# Patient Record
Sex: FEMALE | Race: WHITE | NOT HISPANIC OR LATINO | Employment: OTHER | URBAN - METROPOLITAN AREA
[De-identification: names, ages, dates, MRNs, and addresses within clinical notes are randomized per-mention and may not be internally consistent; named-entity substitution may affect disease eponyms.]

---

## 2017-01-26 ENCOUNTER — PREPPED CHART (OUTPATIENT)
Dept: URBAN - METROPOLITAN AREA CLINIC 35 | Facility: CLINIC | Age: 62
End: 2017-01-26

## 2017-09-25 ASSESSMENT — TONOMETRY
OS_IOP_MMHG: 11
OD_IOP_MMHG: 11

## 2017-09-25 ASSESSMENT — VISUAL ACUITY
OS_CC: 20/40
OD_CC: 20/30

## 2018-01-24 ENCOUNTER — ESTABLISHED COMPREHENSIVE EXAM (OUTPATIENT)
Dept: URBAN - METROPOLITAN AREA CLINIC 35 | Facility: CLINIC | Age: 63
End: 2018-01-24

## 2018-01-24 DIAGNOSIS — H04.123: ICD-10-CM

## 2018-01-24 DIAGNOSIS — H43.813: ICD-10-CM

## 2018-01-24 PROCEDURE — 92015 DETERMINE REFRACTIVE STATE: CPT

## 2018-01-24 PROCEDURE — 92014 COMPRE OPH EXAM EST PT 1/>: CPT

## 2018-01-24 PROCEDURE — 92310-1 LEVEL 1 CONTACT LENS MANAGEMENT

## 2018-01-24 ASSESSMENT — VISUAL ACUITY
OD_SC: CF 4FT
OS_CC: 20/25-1
OS_SC: CF 3FT
OD_CC: 20/40+2

## 2018-01-24 ASSESSMENT — TONOMETRY
OD_IOP_MMHG: 18
OS_IOP_MMHG: 19

## 2018-06-12 ENCOUNTER — EST. PATIENT EMERGENCY (OUTPATIENT)
Dept: URBAN - METROPOLITAN AREA CLINIC 35 | Facility: CLINIC | Age: 63
End: 2018-06-12

## 2018-06-12 DIAGNOSIS — H16.103: ICD-10-CM

## 2018-06-12 DIAGNOSIS — H04.123: ICD-10-CM

## 2018-06-12 PROCEDURE — 92012 INTRM OPH EXAM EST PATIENT: CPT

## 2018-06-12 ASSESSMENT — VISUAL ACUITY
OU_CC: J2 CL
OS_CC: 20/200 CL
OD_CC: 20/30-2 CL
OU_CC: 20/30-2

## 2018-06-12 ASSESSMENT — TONOMETRY
OS_IOP_MMHG: 13
OD_IOP_MMHG: 13

## 2018-07-09 ENCOUNTER — FOLLOW UP (OUTPATIENT)
Dept: URBAN - METROPOLITAN AREA CLINIC 35 | Facility: CLINIC | Age: 63
End: 2018-07-09

## 2018-07-09 DIAGNOSIS — H16.103: ICD-10-CM

## 2018-07-09 DIAGNOSIS — H04.123: ICD-10-CM

## 2018-07-09 PROCEDURE — 92012 INTRM OPH EXAM EST PATIENT: CPT

## 2018-07-09 PROCEDURE — A4263 PERMANENT TEAR DUCT PLUG: HCPCS

## 2018-07-09 PROCEDURE — 68761S PUNCTUM PLUG / SILICONE,EACH

## 2018-07-09 ASSESSMENT — VISUAL ACUITY: OS_CC: 20/40 W/ CLS

## 2019-10-08 ENCOUNTER — ESTABLISHED COMPREHENSIVE EXAM (OUTPATIENT)
Dept: URBAN - METROPOLITAN AREA CLINIC 35 | Facility: CLINIC | Age: 64
End: 2019-10-08

## 2019-10-08 DIAGNOSIS — H52.13: ICD-10-CM

## 2019-10-08 DIAGNOSIS — H25.13: ICD-10-CM

## 2019-10-08 DIAGNOSIS — Z46.0: ICD-10-CM

## 2019-10-08 PROCEDURE — 92014 COMPRE OPH EXAM EST PT 1/>: CPT

## 2019-10-08 PROCEDURE — 92015 DETERMINE REFRACTIVE STATE: CPT

## 2019-10-08 PROCEDURE — 92310-1 LEVEL 1 CONTACT LENS MANAGEMENT

## 2019-10-08 ASSESSMENT — VISUAL ACUITY
OD_CC: J2
OD_SC: CF 6FT
OD_CC: 20/25
OS_CC: 20/40+2
OS_SC: CF 3FT
OS_CC: J2+

## 2019-10-08 ASSESSMENT — TONOMETRY
OS_IOP_MMHG: 11
OD_IOP_MMHG: 10

## 2019-10-25 ENCOUNTER — OFFICE VISIT (OUTPATIENT)
Dept: URGENT CARE | Facility: CLINIC | Age: 64
End: 2019-10-25
Payer: COMMERCIAL

## 2019-10-25 VITALS
BODY MASS INDEX: 23.19 KG/M2 | RESPIRATION RATE: 16 BRPM | OXYGEN SATURATION: 99 % | TEMPERATURE: 98.2 F | WEIGHT: 126 LBS | HEART RATE: 68 BPM | DIASTOLIC BLOOD PRESSURE: 72 MMHG | SYSTOLIC BLOOD PRESSURE: 126 MMHG | HEIGHT: 62 IN

## 2019-10-25 DIAGNOSIS — S60.811A: Primary | ICD-10-CM

## 2019-10-25 DIAGNOSIS — L08.9: Primary | ICD-10-CM

## 2019-10-25 PROCEDURE — G0382 LEV 3 HOSP TYPE B ED VISIT: HCPCS | Performed by: PHYSICIAN ASSISTANT

## 2019-10-25 RX ORDER — GINSENG 100 MG
1 CAPSULE ORAL 2 TIMES DAILY
Status: DISCONTINUED | OUTPATIENT
Start: 2019-10-25 | End: 2019-11-12

## 2019-10-25 RX ORDER — SULFAMETHOXAZOLE AND TRIMETHOPRIM 800; 160 MG/1; MG/1
1 TABLET ORAL 2 TIMES DAILY
Qty: 14 TABLET | Refills: 0 | Status: SHIPPED | OUTPATIENT
Start: 2019-10-25 | End: 2019-11-01

## 2019-10-25 RX ORDER — FLUOXETINE 10 MG/1
10 CAPSULE ORAL DAILY
COMMUNITY

## 2019-10-25 NOTE — PATIENT INSTRUCTIONS
Cellulitis   AMBULATORY CARE:   Cellulitis  is a skin infection caused by bacteria  Cellulitis usually appears on the legs and feet, arms and hands, or face  Common signs and symptoms include the following:   · A red, warm, swollen area on your skin    · Pain when the area is touched    · Bumps or blisters (abscess) that may drain pus    · Bumpy, raised skin that feels like an orange peel  Call 911 if:   · You have sudden trouble breathing or chest pain  Seek care immediately if:   · Your wound gets larger and more painful  · You feel a crackling under your skin when you touch it  · You have purple dots or bumps on your skin, or you see bleeding under your skin  · You have new swelling and pain in your legs  · The red, warm, swollen area gets larger  · You see red streaks coming from the infected area  Contact your healthcare provider if:   · You have a fever  · Your fever or pain does not go away or gets worse  · The area does not get smaller after 2 days of antibiotics  · Your skin is flaking or peeling off  · You have questions or concerns about your condition or care  Treatment for cellulitis  may decrease symptoms, stop the infection from spreading, and cure the infection  Treatment depends on how severe your cellulitis is  Cellulitis may go away on its own  You may  instead need antibiotics to help treat the bacterial infection and medicines for pain  Your healthcare provider may draw a [place] around the edges of your cellulitis  If your cellulitis spreads, your healthcare provider will see it outside of the [place]  Manage your symptoms:   · Elevate the area above the level of your heart  as often as you can  This will help decrease swelling and pain  Prop the area on pillows or blankets to keep it elevated comfortably  · Clean the area daily until the wound scabs over  Gently wash the area with soap and water  Pat dry  Use dressings as directed       · Place cool or warm, wet cloths on the area as directed  Use clean cloths and clean water  Leave it on the area until the cloth is room temperature  Pat the area dry with a clean, dry cloth  The cloths may help decrease pain  Prevent cellulitis:   · Do not scratch bug bites or areas of injury  You increase your risk for cellulitis by scratching these areas  · Do not share personal items, such as towels, clothing, and razors  · Clean exercise equipment  with germ-killing  before and after you use it  · Wash your hands often  Use soap and water  Wash your hands after you use the bathroom, change a child's diapers, or sneeze  Wash your hands before you prepare or eat food  Use lotion to prevent dry, cracked skin  · Wear pressure stockings as directed  You may be told to wear the stockings if you have peripheral edema  The stockings improve blood flow and decrease swelling  · Treat athlete's foot  This can help prevent the spread of a bacterial skin infection  Follow up with your healthcare provider within 3 days, or as directed: Your healthcare provider will check if your cellulitis is getting better  You may need different medicine  Write down your questions so you remember to ask them during your visits  © 2017 [employer]  Information is for End User's use only and may not be sold, redistributed or otherwise used for commercial purposes  All illustrations and images included in CareNotes® are the copyrighted property of [employer]  or [employer]  The above information is an  only  It is not intended as medical advice for individual conditions or treatments  Talk to your doctor, nurse or pharmacist before following any medical regimen to see if it is safe and effective for you

## 2019-11-12 NOTE — PROGRESS NOTES
Assessment/Plan    Wrist abrasion, infected, right, initial encounter [S60 811A, L08 9]  1  Wrist abrasion, infected, right, initial encounter  sulfamethoxazole-trimethoprim (BACTRIM DS) 800-160 mg per tablet    bacitracin topical ointment 1 small application         Subjective:     Patient ID: Jeanette Almanza is a 66 y o  female  Reason For Visit / Chief Complaint  Chief Complaint   Patient presents with    Rash     Reports taking off a wrist band after playing golf and noticed a quarter sized "pressure sore"  Describes area as burning  No other symptoms          53-year-old female presents the clinic with an infected rash to her right wrist   Patient states that she is currently visiting from [place] and states that she plays golf regularly and uses wrist bands when playing  Patient states that after removing 1 of the wrist band she noticed a quarter-sized area with some irritation to the right wrist and since then has noticed increased redness, irritation and now has some mild discharge from the area  Patient denies fevers, chills, numbness or tingling, streaking redness, decreased range of motion to wrist, hand, fingers  Patient describes the area has a soreness that is burning at times  History reviewed  No pertinent past medical history  History reviewed  No pertinent surgical history  History reviewed  No pertinent family history  Review of Systems   Constitutional: Negative for chills and fever  Musculoskeletal: Positive for myalgias  Skin: Positive for color change and wound  Neurological: Negative for weakness and numbness  Objective:    /72 (BP Location: Left arm, Patient Position: Sitting)   Pulse 68   Temp 98 2 °F (36 8 °C) (Tympanic)   Resp 16   Ht 5' 2" (1 575 m)   Wt 57 2 kg (126 lb)   SpO2 99%   BMI 23 05 kg/m²     Physical Exam   Constitutional: She is oriented to person, place, and time  She appears well-developed and well-nourished  No distress  HENT:   Head: Normocephalic and atraumatic  Cardiovascular: Intact distal pulses  Pulmonary/Chest: Effort normal    Musculoskeletal: Normal range of motion  Right wrist: She exhibits tenderness  She exhibits normal range of motion, no bony tenderness, no swelling, no effusion, no crepitus and no deformity  Arms:  Corner sized area of erythema with mild discharge noted to medial aspect of right wrist   No streaking redness, swelling noted  Neurovascularly intact, cap refill < 2 seconds, no decreased sensation noted  Neurological: She is alert and oriented to person, place, and time  Skin: Skin is warm and dry  Capillary refill takes less than 2 seconds  She is not diaphoretic  Nursing note and vitals reviewed

## 2020-06-17 NOTE — PATIENT DISCUSSION
Discussed proper CL wear time. conducted a detailed discussion... I had a detailed discussion with the patient and/or guardian regarding the historical points, exam findings, and any diagnostic results supporting the discharge/admit diagnosis.

## 2020-06-17 NOTE — PATIENT DISCUSSION
Discussed procedures for cleaning CL's each day, stressed importance of not sleeping in lenses. Pt. understands and accepts. Dispensed trial CL OD today. Released B&L Ultra for Astigmatism PLANO -1.25 X 180. Ordered +0.25-1.25x180. RTO in 1 week for CL FU.

## 2020-06-25 NOTE — PATIENT DISCUSSION
Discussed procedures for cleaning CL's each day, stressed importance of not sleeping in lenses. Pt. understands and accepts. Dispensed trial CL OD today. Released +0.25-1.25x180. RTO in 1 week for CL FU.

## 2021-12-23 ENCOUNTER — COMPREHENSIVE EXAM (OUTPATIENT)
Dept: URBAN - METROPOLITAN AREA CLINIC 35 | Facility: CLINIC | Age: 66
End: 2021-12-23

## 2021-12-23 DIAGNOSIS — H52.13: ICD-10-CM

## 2021-12-23 DIAGNOSIS — H25.13: ICD-10-CM

## 2021-12-23 DIAGNOSIS — Z46.0: ICD-10-CM

## 2021-12-23 PROCEDURE — 92015 DETERMINE REFRACTIVE STATE: CPT

## 2021-12-23 PROCEDURE — 92014 COMPRE OPH EXAM EST PT 1/>: CPT

## 2021-12-23 PROCEDURE — 92310-1 LEVEL 1 CONTACT LENS MANAGEMENT

## 2021-12-23 ASSESSMENT — VISUAL ACUITY
OS_CC: 20/30-1
OS_CC: J2
OD_CC: 20/30-2
OD_CC: J2

## 2021-12-23 ASSESSMENT — TONOMETRY
OD_IOP_MMHG: 14
OS_IOP_MMHG: 14

## 2025-04-12 ENCOUNTER — COMPREHENSIVE EXAM (OUTPATIENT)
Age: 70
End: 2025-04-12

## 2025-04-12 DIAGNOSIS — H04.123: ICD-10-CM

## 2025-04-12 DIAGNOSIS — H25.813: ICD-10-CM

## 2025-04-12 DIAGNOSIS — Z97.3: ICD-10-CM

## 2025-04-12 DIAGNOSIS — H53.2: ICD-10-CM

## 2025-04-12 DIAGNOSIS — H43.813: ICD-10-CM

## 2025-04-12 PROCEDURE — 92014 COMPRE OPH EXAM EST PT 1/>: CPT

## 2025-04-12 PROCEDURE — 92310-1 LEVEL 1 SOFT LENS UPDATE
